# Patient Record
(demographics unavailable — no encounter records)

---

## 2024-12-20 NOTE — DISCUSSION/SUMMARY
[de-identified] : The natural progression of Osteoarthritis was explained to the patient.  We discussed the possible treatment options from conservative to operative.  These included NSAIDS, Glucosamine and Chondrotin sulfate, and Physical Therapy as well different types of injections.  We also discussed that at some point they may progress to needed a TKA.  Information and pamphlets were given when appropriate.  The risks, benefits, contents and alternatives to injection were explained in full to the patient.  Risks outlined include but are not limited to infection, sepsis, bleeding, scarring, skin discoloration, temporary increase in pain, syncopal episode, failure to resolve symptoms, allergic reaction, flare reaction, permanent white skin discoloration, symptom recurrence, and elevation of blood sugar in diabetics.  Patient understood the risks.  All questions were answered.  After discussion of options, patient requested an injection.  Oral informed consent was obtained and sterile prep was done of the injection site.  Sterile technique was used to introduce the mixture.  Patient tolerated the procedure well.  Patient advised to ice the injection site this evening.  Signs and symptoms of infection reviewed and patient advised to call immediately for redness, fevers, and/or chills.  Entered by Josephine Valdivia acting as a scribe.

## 2024-12-20 NOTE — ASSESSMENT
[FreeTextEntry1] : Previous doc: Adv OA but functioning well with Skiing golf and tennis - I will asp and inj today with HA and he will cont HEP wife is a Chiropractor - he is already on natural NSAIDs - Did discuss PRP And stem cells - he is to highly functional now for a TKA 2/26/19: Inj tolerated well - asp 20cc. 4/9 he is able to do almost anything but affects him the next day - he wants to try some PT as well 10/15/19: Still doing well since HA inj in Feb - no pain today - discussed repeat series when symptoms return. 2/4/20: Adv OA but no sig progression - start orthovisc left knee. 2/11/20: Inj tolerated well. 2/25/20: Inj tolerated well. 3/3/20: Injection tolerated well. 4/2/21: Advanced OA bilateral knees. Will start Orthovisc today bilaterally. Follow up in 1 week to continue 4/13/21: Inj tolerated well. Asp 15cc both knees. 4/20/21: Inj tolerated well. 4/27/21: Inj tolerated well. 1/25/22: Good results from last HA injections, will start orthovisc both knees today, tolerated well. 2/8/22: Inj tolerated well. 2/22/22: Inj tolerated well. 3/22/22: Inj tolerated well. 1/23/23: Good relief from previous HA injections, synvsc-one both knees today, tolerated well. He is planning to go to [place] at the end of the week. Given PT rx. 5/5/23: Acute flare-up and effusion of the right knee. CSI/asp today, tolerated well. 11/28/23: Pt with adv OA b/l knees. Discussed conservative tx options- risks and benefits explained. Pt is well informed and would like to repeat HA series. Euflexxa #1 b/l knees done today- tolerated well. Follow up 1 week for Euflexxa #2. 12/5/23: Inj tolerated well. 12/15/23: Inj tolerated well.  12/20/24: Pt with adv b/l knee OA. No sig change on XR. Sig relief with Euflexxa for almost a year, pain now worsening. Will repeat b/l Euflexxa series- injection #1 done today- henok well. F/up 1 week to cont series.

## 2024-12-20 NOTE — HISTORY OF PRESENT ILLNESS
[de-identified] : 12/20/24: Significant relief for almost a year with b/l knee Euflexxa. Knee pain worsening recently, LT knee is bothersome more frequently but states both knees are about equal. Interested in repeating this  Previous doc::  Left knee pain for several years on and off - but lately getting worse medial with some varus - he was playing tennis and got worse - he did ice and heat helped - he does ski - has not had treatment prior - doesn't like meds - pain most days - worse with more activity that is more strenuous and hard streets - he is still very active with golf and tennis 2/26/19: Orthovisc #3 left knee, no change yet. 4/9 he is doing more still has pain some days feels inj helped somewhat but still has good and bad days 10/15/19: Injectioned earlier this year were very helpful and still doing well. 2/4/20: Starting to have some left knee pain again, not severe. 2/11/20: Orthovisc #2 left knee. 2/25/20: Orthovisc #3 left knee. 3/3/20: Orthovisc #4 left knee. has improvement from last injection 4/2/21: Good relief with Orthovisc left knee last year, now right knee hurting.. 4/13/21: Orthovisc #2 b/l knees. 4/20/21: Orthovisc #3 b/l knees. 4/27/21: Orthovisc #4 b/l knees. 1/25/22: Injections helped a lot, wants to try again. 2/8/22: Orthovisc #2 b/l knees. 2/22/22: Orthovisc #3 b/l knees. 3/22/22: Orthovisc #4 b/l knees 1/23/23: Good relief from previous HA injections until recently. He would like to restart.   5/5/23: Having some increased right knee pain and swelling for the past few days after he had been playing tennis.  11/28/23: Pt with adv OA b/l knees (R=L). No specific injury/trauma. Has done gel inj in the past with good relief. csi in May provided good relief until recently. Flares up after playing golf/tennis. Not taking any meds for pain  12/5/23: Euflexxa #2 b/l knees. 12/15/23: Euflexxa #3 b/l knees.  [FreeTextEntry5] : wants to start hs inj

## 2024-12-20 NOTE — IMAGING
[de-identified] : Right Knee:  Valgus deformity.  ROM 5-115 degrees.  ambulates without assistive device.   Left Knee:  moderate effusion  varus deformity.  medial joint line tenderness.  ROM 5-110    XR B/L KNEES (12/20/24) showing advanced OA- KL 4

## 2024-12-20 NOTE — PROCEDURE
[Large Joint Injection] : Large joint injection [Bilateral] : bilaterally of the [Knee] : knee [Pain] : pain [Inflammation] : inflammation [X-ray evidence of Osteoarthritis on this or prior visit] : x-ray evidence of Osteoarthritis on this or prior visit [Repeat series performed] : repeat series performed [Alcohol] : alcohol [Betadine] : betadine [Ethyl Chloride sprayed topically] : ethyl chloride sprayed topically [Sterile technique used] : sterile technique used [Euflexxa(20mg)] : 20mg of Euflexxa [] : Patient tolerated procedure well [Call if redness, pain or fever occur] : call if redness, pain or fever occur [Apply ice for 15min out of every hour for the next 12-24 hours as tolerated] : apply ice for 15 minutes out of every hour for the next 12-24 hours as tolerated [Previous OTC use and PT nontherapeutic] : patient has tried OTC's including aspirin, Ibuprofen, Aleve, etc or prescription NSAIDS, and/or exercises at home and/or physical therapy without satisfactory response [Risks, benefits, alternatives discussed / Verbal consent obtained] : the risks benefits, and alternatives have been discussed, and verbal consent was obtained [All ultrasound images have been permanently captured and stored accordingly in our picture archiving and communication system] : All ultrasound images have been permanently captured and stored accordingly in our picture archiving and communication system [Visualization of the needle and placement of injection was performed without complication] : visualization of the needle and placement of injection was performed without complication [#1] : series #1 [Effusion] : effusion [de-identified] : 10cc R knee, 20cc L knee [de-identified] : clear, yellow

## 2025-01-17 NOTE — HISTORY OF PRESENT ILLNESS
[2] : 2 [Euflexxa] : Euflexxa [de-identified] : 1/17/25: Euflexxa #2 b/l knees.  Previous doc::  Left knee pain for several years on and off - but lately getting worse medial with some varus - he was playing tennis and got worse - he did ice and heat helped - he does ski - has not had treatment prior - doesn't like meds - pain most days - worse with more activity that is more strenuous and hard streets - he is still very active with golf and tennis 2/26/19: Orthovisc #3 left knee, no change yet. 4/9 he is doing more still has pain some days feels inj helped somewhat but still has good and bad days 10/15/19: Injectioned earlier this year were very helpful and still doing well. 2/4/20: Starting to have some left knee pain again, not severe. 2/11/20: Orthovisc #2 left knee. 2/25/20: Orthovisc #3 left knee. 3/3/20: Orthovisc #4 left knee. has improvement from last injection 4/2/21: Good relief with Orthovisc left knee last year, now right knee hurting.. 4/13/21: Orthovisc #2 b/l knees. 4/20/21: Orthovisc #3 b/l knees. 4/27/21: Orthovisc #4 b/l knees. 1/25/22: Injections helped a lot, wants to try again. 2/8/22: Orthovisc #2 b/l knees. 2/22/22: Orthovisc #3 b/l knees. 3/22/22: Orthovisc #4 b/l knees 1/23/23: Good relief from previous HA injections until recently. He would like to restart.   5/5/23: Having some increased right knee pain and swelling for the past few days after he had been playing tennis.  11/28/23: Pt with adv OA b/l knees (R=L). No specific injury/trauma. Has done gel inj in the past with good relief. csi in May provided good relief until recently. Flares up after playing golf/tennis. Not taking any meds for pain  12/5/23: Euflexxa #2 b/l knees. 12/15/23: Euflexxa #3 b/l knees. 12/20/24: Significant relief for almost a year with b/l knee Euflexxa. Knee pain worsening recently, LT knee is bothersome more frequently but states both knees are about equal. Interested in repeating this

## 2025-01-17 NOTE — DISCUSSION/SUMMARY
[de-identified] : I saw the patient under the supervision of Dr. Avilez and followed his plan of care.

## 2025-01-17 NOTE — PROCEDURE
[Large Joint Injection] : Large joint injection [Bilateral] : bilaterally of the [Knee] : knee [Pain] : pain [Inflammation] : inflammation [X-ray evidence of Osteoarthritis on this or prior visit] : x-ray evidence of Osteoarthritis on this or prior visit [Repeat series performed] : repeat series performed [Alcohol] : alcohol [Betadine] : betadine [Ethyl Chloride sprayed topically] : ethyl chloride sprayed topically [Sterile technique used] : sterile technique used [Euflexxa(20mg)] : 20mg of Euflexxa [Effusion] : effusion [] : Patient tolerated procedure well [Call if redness, pain or fever occur] : call if redness, pain or fever occur [Apply ice for 15min out of every hour for the next 12-24 hours as tolerated] : apply ice for 15 minutes out of every hour for the next 12-24 hours as tolerated [Previous OTC use and PT nontherapeutic] : patient has tried OTC's including aspirin, Ibuprofen, Aleve, etc or prescription NSAIDS, and/or exercises at home and/or physical therapy without satisfactory response [Risks, benefits, alternatives discussed / Verbal consent obtained] : the risks benefits, and alternatives have been discussed, and verbal consent was obtained [All ultrasound images have been permanently captured and stored accordingly in our picture archiving and communication system] : All ultrasound images have been permanently captured and stored accordingly in our picture archiving and communication system [Visualization of the needle and placement of injection was performed without complication] : visualization of the needle and placement of injection was performed without complication [#2] : series #2 [de-identified] : 20cc L knee [de-identified] : clear, yellow

## 2025-01-17 NOTE — IMAGING
[de-identified] : Right Knee:  Valgus deformity.  ROM 5-115 degrees.  ambulates without assistive device.   Left Knee:  moderate effusion  varus deformity.  medial joint line tenderness.  ROM 5-110    XR B/L KNEES (12/20/24) showing advanced OA- KL 4

## 2025-01-17 NOTE — ASSESSMENT
[FreeTextEntry1] : Previous doc: Adv OA but functioning well with Skiing golf and tennis - I will asp and inj today with HA and he will cont HEP wife is a Chiropractor - he is already on natural NSAIDs - Did discuss PRP And stem cells - he is to highly functional now for a TKA 2/26/19: Inj tolerated well - asp 20cc. 4/9 he is able to do almost anything but affects him the next day - he wants to try some PT as well 10/15/19: Still doing well since HA inj in Feb - no pain today - discussed repeat series when symptoms return. 2/4/20: Adv OA but no sig progression - start orthovisc left knee. 2/11/20: Inj tolerated well. 2/25/20: Inj tolerated well. 3/3/20: Injection tolerated well. 4/2/21: Advanced OA bilateral knees. Will start Orthovisc today bilaterally. Follow up in 1 week to continue 4/13/21: Inj tolerated well. Asp 15cc both knees. 4/20/21: Inj tolerated well. 4/27/21: Inj tolerated well. 1/25/22: Good results from last HA injections, will start orthovisc both knees today, tolerated well. 2/8/22: Inj tolerated well. 2/22/22: Inj tolerated well. 3/22/22: Inj tolerated well. 1/23/23: Good relief from previous HA injections, synvsc-one both knees today, tolerated well. He is planning to go to [place] at the end of the week. Given PT rx. 5/5/23: Acute flare-up and effusion of the right knee. CSI/asp today, tolerated well. 11/28/23: Pt with adv OA b/l knees. Discussed conservative tx options- risks and benefits explained. Pt is well informed and would like to repeat HA series. Euflexxa #1 b/l knees done today- tolerated well. Follow up 1 week for Euflexxa #2. 12/5/23: Inj tolerated well. 12/15/23: Inj tolerated well. 12/20/24: Pt with adv b/l knee OA. No sig change on XR. Sig relief with Euflexxa for almost a year, pain now worsening. Will repeat b/l Euflexxa series- injection #1 done today- henok well. F/up 1 week to cont series.  1/17/25: Inj tolerated well.

## 2025-01-28 NOTE — DISCUSSION/SUMMARY
[de-identified] : I saw the patient under the supervision of Dr. Avilez and followed his plan of care.

## 2025-01-28 NOTE — IMAGING
[de-identified] : Right Knee:  Valgus deformity.  ROM 5-115 degrees.  ambulates without assistive device.   Left Knee:  moderate effusion  varus deformity.  medial joint line tenderness.  ROM 5-110    XR B/L KNEES (12/20/24) showing advanced OA- KL 4

## 2025-01-28 NOTE — PROCEDURE
[Large Joint Injection] : Large joint injection [Bilateral] : bilaterally of the [Knee] : knee [Pain] : pain [Inflammation] : inflammation [X-ray evidence of Osteoarthritis on this or prior visit] : x-ray evidence of Osteoarthritis on this or prior visit [Repeat series performed] : repeat series performed [Alcohol] : alcohol [Betadine] : betadine [Ethyl Chloride sprayed topically] : ethyl chloride sprayed topically [Sterile technique used] : sterile technique used [Euflexxa(20mg)] : 20mg of Euflexxa [#2] : series #2 [Effusion] : effusion [] : Patient tolerated procedure well [Call if redness, pain or fever occur] : call if redness, pain or fever occur [Apply ice for 15min out of every hour for the next 12-24 hours as tolerated] : apply ice for 15 minutes out of every hour for the next 12-24 hours as tolerated [Previous OTC use and PT nontherapeutic] : patient has tried OTC's including aspirin, Ibuprofen, Aleve, etc or prescription NSAIDS, and/or exercises at home and/or physical therapy without satisfactory response [Risks, benefits, alternatives discussed / Verbal consent obtained] : the risks benefits, and alternatives have been discussed, and verbal consent was obtained [All ultrasound images have been permanently captured and stored accordingly in our picture archiving and communication system] : All ultrasound images have been permanently captured and stored accordingly in our picture archiving and communication system [Visualization of the needle and placement of injection was performed without complication] : visualization of the needle and placement of injection was performed without complication [de-identified] : 20cc L knee [de-identified] : clear, yellow

## 2025-01-28 NOTE — HISTORY OF PRESENT ILLNESS
[de-identified] : 1/28/25: Euflexxa #3 b/l knees.  Previous doc::  Left knee pain for several years on and off - but lately getting worse medial with some varus - he was playing tennis and got worse - he did ice and heat helped - he does ski - has not had treatment prior - doesn't like meds - pain most days - worse with more activity that is more strenuous and hard streets - he is still very active with golf and tennis 2/26/19: Orthovisc #3 left knee, no change yet. 4/9 he is doing more still has pain some days feels inj helped somewhat but still has good and bad days 10/15/19: Injectioned earlier this year were very helpful and still doing well. 2/4/20: Starting to have some left knee pain again, not severe. 2/11/20: Orthovisc #2 left knee. 2/25/20: Orthovisc #3 left knee. 3/3/20: Orthovisc #4 left knee. has improvement from last injection 4/2/21: Good relief with Orthovisc left knee last year, now right knee hurting.. 4/13/21: Orthovisc #2 b/l knees. 4/20/21: Orthovisc #3 b/l knees. 4/27/21: Orthovisc #4 b/l knees. 1/25/22: Injections helped a lot, wants to try again. 2/8/22: Orthovisc #2 b/l knees. 2/22/22: Orthovisc #3 b/l knees. 3/22/22: Orthovisc #4 b/l knees 1/23/23: Good relief from previous HA injections until recently. He would like to restart.   5/5/23: Having some increased right knee pain and swelling for the past few days after he had been playing tennis.  11/28/23: Pt with adv OA b/l knees (R=L). No specific injury/trauma. Has done gel inj in the past with good relief. csi in May provided good relief until recently. Flares up after playing golf/tennis. Not taking any meds for pain  12/5/23: Euflexxa #2 b/l knees. 12/15/23: Euflexxa #3 b/l knees. 12/20/24: Significant relief for almost a year with b/l knee Euflexxa. Knee pain worsening recently, LT knee is bothersome more frequently but states both knees are about equal. Interested in repeating this 1/17/25: Euflexxa #2 b/l knees.

## 2025-01-28 NOTE — ASSESSMENT
[FreeTextEntry1] : Previous doc: Adv OA but functioning well with Skiing golf and tennis - I will asp and inj today with HA and he will cont HEP wife is a Chiropractor - he is already on natural NSAIDs - Did discuss PRP And stem cells - he is to highly functional now for a TKA 2/26/19: Inj tolerated well - asp 20cc. 4/9 he is able to do almost anything but affects him the next day - he wants to try some PT as well 10/15/19: Still doing well since HA inj in Feb - no pain today - discussed repeat series when symptoms return. 2/4/20: Adv OA but no sig progression - start orthovisc left knee. 2/11/20: Inj tolerated well. 2/25/20: Inj tolerated well. 3/3/20: Injection tolerated well. 4/2/21: Advanced OA bilateral knees. Will start Orthovisc today bilaterally. Follow up in 1 week to continue 4/13/21: Inj tolerated well. Asp 15cc both knees. 4/20/21: Inj tolerated well. 4/27/21: Inj tolerated well. 1/25/22: Good results from last HA injections, will start orthovisc both knees today, tolerated well. 2/8/22: Inj tolerated well. 2/22/22: Inj tolerated well. 3/22/22: Inj tolerated well. 1/23/23: Good relief from previous HA injections, synvsc-one both knees today, tolerated well. He is planning to go to [place] at the end of the week. Given PT rx. 5/5/23: Acute flare-up and effusion of the right knee. CSI/asp today, tolerated well. 11/28/23: Pt with adv OA b/l knees. Discussed conservative tx options- risks and benefits explained. Pt is well informed and would like to repeat HA series. Euflexxa #1 b/l knees done today- tolerated well. Follow up 1 week for Euflexxa #2. 12/5/23: Inj tolerated well. 12/15/23: Inj tolerated well. 12/20/24: Pt with adv b/l knee OA. No sig change on XR. Sig relief with Euflexxa for almost a year, pain now worsening. Will repeat b/l Euflexxa series- injection #1 done today- henok well. F/up 1 week to cont series. 1/17/25: Inj tolerated well.  1/28/25: Inj tolerated well.